# Patient Record
Sex: FEMALE | Race: WHITE | NOT HISPANIC OR LATINO
[De-identification: names, ages, dates, MRNs, and addresses within clinical notes are randomized per-mention and may not be internally consistent; named-entity substitution may affect disease eponyms.]

---

## 2023-05-05 ENCOUNTER — TRANSCRIPTION ENCOUNTER (OUTPATIENT)
Age: 73
End: 2023-05-05

## 2023-05-05 PROBLEM — Z00.00 ENCOUNTER FOR PREVENTIVE HEALTH EXAMINATION: Status: ACTIVE | Noted: 2023-05-05

## 2023-05-12 ENCOUNTER — NON-APPOINTMENT (OUTPATIENT)
Age: 73
End: 2023-05-12

## 2023-05-12 ENCOUNTER — APPOINTMENT (OUTPATIENT)
Dept: PAIN MANAGEMENT | Facility: CLINIC | Age: 73
End: 2023-05-12
Payer: MEDICARE

## 2023-05-12 VITALS
WEIGHT: 177 LBS | OXYGEN SATURATION: 96 % | SYSTOLIC BLOOD PRESSURE: 136 MMHG | HEART RATE: 71 BPM | DIASTOLIC BLOOD PRESSURE: 90 MMHG | BODY MASS INDEX: 29.49 KG/M2 | HEIGHT: 65 IN

## 2023-05-12 DIAGNOSIS — E07.9 DISORDER OF THYROID, UNSPECIFIED: ICD-10-CM

## 2023-05-12 DIAGNOSIS — I10 ESSENTIAL (PRIMARY) HYPERTENSION: ICD-10-CM

## 2023-05-12 DIAGNOSIS — Z87.39 PERSONAL HISTORY OF OTHER DISEASES OF THE MUSCULOSKELETAL SYSTEM AND CONNECTIVE TISSUE: ICD-10-CM

## 2023-05-12 PROCEDURE — 99204 OFFICE O/P NEW MOD 45 MIN: CPT

## 2023-05-12 RX ORDER — LEVOTHYROXINE SODIUM 0.17 MG/1
TABLET ORAL
Refills: 0 | Status: ACTIVE | COMMUNITY

## 2023-05-12 RX ORDER — AMLODIPINE BESYLATE 5 MG/1
TABLET ORAL
Refills: 0 | Status: ACTIVE | COMMUNITY

## 2023-05-12 NOTE — HISTORY OF PRESENT ILLNESS
[Back Pain] : back pain [Constant] : constant [7] : a current pain level of 7/10 [10] : a maximum pain level of 10/10 [Sharp] : sharp [Dull] : dull [Shooting] : shooting [Bending] : bending [Rest] : rest [FreeTextEntry1] : 72 yof presents w/ severe low back pain. Patient underwent L4-L5 laminectomy at the [facility] spine [facility] with Dr. Rowe on 06/18/2015. Back pain began in the early 2000's after bunion surgery. Back surgery did provide her relief. She then had right hip surgery in 2017 at [facility] which was beneficial. She had left knee meniscus surgery with Dr. Gutierrez in 2020. Quality of life is impaired. There has been a severe exacerbation of the patient's chronic pain.

## 2023-05-12 NOTE — DATA REVIEWED
[No studies available for review at this time.] : No studies available for review at this time. [FreeTextEntry1] : MRI Lumbar Spine:\par \par L4-L5 laminectomy

## 2023-05-12 NOTE — PHYSICAL EXAM

## 2023-06-02 ENCOUNTER — APPOINTMENT (OUTPATIENT)
Dept: PAIN MANAGEMENT | Facility: CLINIC | Age: 73
End: 2023-06-02
Payer: MEDICARE

## 2023-06-02 VITALS
BODY MASS INDEX: 29.49 KG/M2 | DIASTOLIC BLOOD PRESSURE: 88 MMHG | HEIGHT: 65 IN | HEART RATE: 78 BPM | SYSTOLIC BLOOD PRESSURE: 136 MMHG | WEIGHT: 177 LBS | OXYGEN SATURATION: 93 %

## 2023-06-02 PROCEDURE — 99214 OFFICE O/P EST MOD 30 MIN: CPT

## 2023-06-02 NOTE — ASSESSMENT
[FreeTextEntry1] : 72 yof w/ continued back pain after L4-L5 laminectomy.\par \par I have personally reviewed the patient's MRI in detail and discussed it with them which is significant for L4-L5 previous surgery.\par \par The patient has failed to have relief with medication management. The patient has failed to have relief with more then six weeks of physical therapy within the last three months. Given the patients failure to improve with all other conservative measures, recommend L5-S1 interlaminar epidural steroid injection under fluoroscopic guidance. The patient will follow-up with me in my office two weeks following intervention.\par \par I have discussed in detail with the patient that an interventional spine procedure is associated with potential risks. The procedure may include an injection of steroid and potentially other medications (local anesthetic and normal saline) into the epidural space or surrounding tissue of the spine. There are significant risks of this procedure which include and are not limited to infection, bleeding, worsening pain, dural puncture leading to post-dural puncture headache, nerve damage, spinal cord injury, paralysis, stroke, and death. There is a chance that the procedure does not improve their pain. There are risks associated with the steroid being absorbed into the body systemically. These include dysphoria, difficulty sleeping, mood swings, and personality changes. Pre-menopausal women may notice a regularity his in her menstrual cycle for 2-3 months following the injection. Steroids can specifically affect patients with hypertension, diabetes, and peptic ulcers. The procedure may cause a temporary increase in blood pressure and blood glucose, and may adversely affect a peptic ulcer. Other, more rare complications, including avascular necrosis of the joints, glaucoma, and osteoporosis. I have discussed the risks of the procedure at length with the patient, and the potential benefits of pain relief. I have offered alternatives to the procedure. All questions were answered. The patient expressed understanding and wishes to proceed with the procedure.\par \par Physical therapy prescribed - goal will be to increase ROM, strengthening, postural training, other modalities ad matthew which may include massage and stim. Goals of therapy discussed with the patient in detail and will be discussed with physical therapist. Patient will follow-up following course of physical therapy to monitor progress and adjust therapy as needed.\par \par Acetaminophen 1,000 mg q8h prn for moderate pain. Risks, benefits, and alternatives of acetaminophen discussed with patient.\par \par Ibuprofen 600 mg q8h prn add when pain is not adequately controlled with acetaminophen. Risks, benefits, and alternatives of ibuprofen discussed with patient.\par \par Diet and nutritional strategies discussed which may improve patients pain and will improve overall health.\par

## 2023-06-02 NOTE — DATA REVIEWED
[No studies available for review at this time.] : No studies available for review at this time. [FreeTextEntry1] : MRI Lumbar Spine:\par \par L4-L5 laminectomy with worsening moderate canal stenosis.\par L5-S1: moderate right and mild left foraminal stenosis.

## 2023-06-02 NOTE — PHYSICAL EXAM

## 2023-06-02 NOTE — HISTORY OF PRESENT ILLNESS
[Back Pain] : back pain [Constant] : constant [7] : a current pain level of 7/10 [10] : a maximum pain level of 10/10 [Sharp] : sharp [Dull] : dull [Shooting] : shooting [Bending] : bending [Rest] : rest [FreeTextEntry1] : Interval History:\par Patient returns for follow-up today. She wishes to review her MRI and xray. Pain is progressing. Low back pain radiates down the bilateral lower extremities, left worse then right. Quality of life is impaired. There has been a severe exacerbation of the patient's chronic pain.\par \par HPI: 72 yof presents w/ severe low back pain. Patient underwent L4-L5 laminectomy at the [facility] spine institute with Dr. Rowe on 06/18/2015. Back pain began in the early 2000's after bunion surgery. Back surgery did provide her relief. She then had right hip surgery in 2017 at [facility] which was beneficial. She had left knee meniscus surgery with Dr. Gutierrez in 2020. Quality of life is impaired. There has been a severe exacerbation of the patient's chronic pain.

## 2023-06-06 ENCOUNTER — TRANSCRIPTION ENCOUNTER (OUTPATIENT)
Age: 73
End: 2023-06-06

## 2023-06-06 ENCOUNTER — APPOINTMENT (OUTPATIENT)
Dept: PAIN MANAGEMENT | Facility: HOSPITAL | Age: 73
End: 2023-06-06

## 2023-06-23 ENCOUNTER — APPOINTMENT (OUTPATIENT)
Dept: PAIN MANAGEMENT | Facility: CLINIC | Age: 73
End: 2023-06-23
Payer: MEDICARE

## 2023-06-23 VITALS
WEIGHT: 177 LBS | SYSTOLIC BLOOD PRESSURE: 133 MMHG | BODY MASS INDEX: 29.49 KG/M2 | DIASTOLIC BLOOD PRESSURE: 77 MMHG | OXYGEN SATURATION: 95 % | HEART RATE: 67 BPM | HEIGHT: 65 IN

## 2023-06-23 DIAGNOSIS — M54.16 RADICULOPATHY, LUMBAR REGION: ICD-10-CM

## 2023-06-23 DIAGNOSIS — M79.10 MYALGIA, UNSPECIFIED SITE: ICD-10-CM

## 2023-06-23 DIAGNOSIS — M47.817 SPONDYLOSIS W/OUT MYELOPATHY OR RADICULOPATHY, LUMBOSACRAL REGION: ICD-10-CM

## 2023-06-23 PROCEDURE — 99213 OFFICE O/P EST LOW 20 MIN: CPT

## 2023-06-23 NOTE — HISTORY OF PRESENT ILLNESS
[Back Pain] : back pain [Constant] : constant [7] : a current pain level of 7/10 [10] : a maximum pain level of 10/10 [Sharp] : sharp [Dull] : dull [Shooting] : shooting [Bending] : bending [Rest] : rest [FreeTextEntry1] : Interval History:\par Patient returns for follow-up today. She underwent ESTEFANIA with significant relief. She feels much better. Doing well in PT.\par \par HPI: 72 yof presents w/ severe low back pain. Patient underwent L4-L5 laminectomy at the [facility] spine institute with Dr. Rowe on 06/18/2015. Back pain began in the early 2000's after bunion surgery. Back surgery did provide her relief. She then had right hip surgery in 2017 at [facility] which was beneficial. She had left knee meniscus surgery with Dr. Gutierrez in 2020. Quality of life is impaired. There has been a severe exacerbation of the patient's chronic pain.\par \par Interventions:\par L5-S1 interlaminar

## 2023-06-23 NOTE — PHYSICAL EXAM

## 2023-06-23 NOTE — ASSESSMENT
[FreeTextEntry1] : 72 yof w/ continued back pain after L4-L5 laminectomy.\par \par I have personally reviewed the patient's MRI in detail and discussed it with them which is significant for L4-L5 previous surgery.\par \par Physical therapy prescribed - goal will be to increase ROM, strengthening, postural training, other modalities ad matthew which may include massage and stim. Goals of therapy discussed with the patient in detail and will be discussed with physical therapist. Patient will follow-up following course of physical therapy to monitor progress and adjust therapy as needed.\par \par Acetaminophen 1,000 mg q8h prn for moderate pain. Risks, benefits, and alternatives of acetaminophen discussed with patient.\par \par Ibuprofen 600 mg q8h prn add when pain is not adequately controlled with acetaminophen. Risks, benefits, and alternatives of ibuprofen discussed with patient.\par \par Diet and nutritional strategies discussed which may improve patients pain and will improve overall health.\par

## 2023-09-15 ENCOUNTER — APPOINTMENT (OUTPATIENT)
Dept: ORTHOPEDIC SURGERY | Facility: CLINIC | Age: 73
End: 2023-09-15
Payer: MEDICARE

## 2023-09-15 ENCOUNTER — RESULT REVIEW (OUTPATIENT)
Age: 73
End: 2023-09-15

## 2023-09-15 VITALS — BODY MASS INDEX: 29.49 KG/M2 | WEIGHT: 177 LBS | HEIGHT: 65 IN

## 2023-09-15 PROCEDURE — 20610 DRAIN/INJ JOINT/BURSA W/O US: CPT | Mod: 50

## 2023-09-15 PROCEDURE — 99213 OFFICE O/P EST LOW 20 MIN: CPT | Mod: 25

## 2023-10-11 ENCOUNTER — APPOINTMENT (OUTPATIENT)
Dept: ORTHOPEDIC SURGERY | Facility: CLINIC | Age: 73
End: 2023-10-11
Payer: MEDICARE

## 2023-10-11 VITALS — BODY MASS INDEX: 29.32 KG/M2 | WEIGHT: 176 LBS | HEIGHT: 65 IN

## 2023-10-11 PROCEDURE — 20610 DRAIN/INJ JOINT/BURSA W/O US: CPT | Mod: RT

## 2023-10-11 PROCEDURE — 99213 OFFICE O/P EST LOW 20 MIN: CPT | Mod: 25

## 2023-10-18 ENCOUNTER — APPOINTMENT (OUTPATIENT)
Dept: ORTHOPEDIC SURGERY | Facility: CLINIC | Age: 73
End: 2023-10-18
Payer: MEDICARE

## 2023-10-18 VITALS — WEIGHT: 176 LBS | HEIGHT: 65 IN | BODY MASS INDEX: 29.32 KG/M2

## 2023-10-18 PROCEDURE — 20610 DRAIN/INJ JOINT/BURSA W/O US: CPT | Mod: RT

## 2023-10-27 ENCOUNTER — APPOINTMENT (OUTPATIENT)
Dept: ORTHOPEDIC SURGERY | Facility: CLINIC | Age: 73
End: 2023-10-27
Payer: MEDICARE

## 2023-10-27 VITALS — BODY MASS INDEX: 29.16 KG/M2 | WEIGHT: 175 LBS | HEIGHT: 65 IN

## 2023-10-27 PROCEDURE — 20610 DRAIN/INJ JOINT/BURSA W/O US: CPT | Mod: RT

## 2024-01-12 ENCOUNTER — APPOINTMENT (OUTPATIENT)
Dept: ORTHOPEDIC SURGERY | Facility: CLINIC | Age: 74
End: 2024-01-12
Payer: MEDICARE

## 2024-01-12 VITALS — HEIGHT: 65 IN | WEIGHT: 175 LBS | BODY MASS INDEX: 29.16 KG/M2

## 2024-01-12 PROCEDURE — 99214 OFFICE O/P EST MOD 30 MIN: CPT | Mod: 57

## 2024-01-12 NOTE — HISTORY OF PRESENT ILLNESS
[de-identified] : Patient comes in with more than 6 months of left knee pain atraumatic in onset.  Patient has tried greater than 6 months of nsaids, physical therapy and doctor directed exercises and injections.  Patient continues to have pain that is 8/10 in severity and interferes with activities of daily living such as putting on shoes and socks, walking two blocks, and getting up and down from a chair and toilet.  Knee osteoarthritis outcome score is 8.1 right knee the same many questions about knee replacements

## 2024-01-12 NOTE — DISCUSSION/SUMMARY
[de-identified] : This patient has failed non-operative treatments for left knee arthritis and is now indicated for a total knee replacement.  I had a long discussion with the patient regarding the plan, the expected outcome, the risks, benefits, and alternatives of surgery. The risks include, but are not limited to, infection (which may require future surgery and removal of implants), bleeding (which may require a transfusion), damage to nerves, arteries, veins, tendons, muscles and other adjacent structures leading to numbness, weakness, decreased function and/or possible surgical repair. Also discussed the possibilities of intraoperative and post operative fractures, implant loosening, stiffness, need for extensive therapy and revision for variety of reasons, blood clots and other possible medical complications etc. right also she will think about it and call  deni

## 2024-01-12 NOTE — PHYSICAL EXAM
[de-identified] : Knee ranges 5-115 degrees with pain and crepitus stable to varus, valgus, anterior and posterior stress neurovascularly intact distally no pain with hip range of motion negative straight leg raise no effusion, synovitis, or edema or erythema skin intact both

## 2024-04-04 ENCOUNTER — RESULT REVIEW (OUTPATIENT)
Age: 74
End: 2024-04-04

## 2024-04-12 ENCOUNTER — APPOINTMENT (OUTPATIENT)
Dept: ORTHOPEDIC SURGERY | Facility: CLINIC | Age: 74
End: 2024-04-12
Payer: MEDICARE

## 2024-04-12 VITALS — BODY MASS INDEX: 29.16 KG/M2 | WEIGHT: 175 LBS | HEIGHT: 65 IN

## 2024-04-12 DIAGNOSIS — M17.0 BILATERAL PRIMARY OSTEOARTHRITIS OF KNEE: ICD-10-CM

## 2024-04-12 PROCEDURE — 99214 OFFICE O/P EST MOD 30 MIN: CPT

## 2024-04-12 NOTE — HISTORY OF PRESENT ILLNESS
[de-identified] : Patient comes in with more than 6 months of left knee pain atraumatic in onset.  Patient has tried greater than 6 months of nsaids, physical therapy and doctor directed exercises and injections.  Patient continues to have pain that is 8/10 in severity and interferes with activities of daily living such as putting on shoes and socks, walking two blocks, and getting up and down from a chair and toilet.  Knee osteoarthritis outcome score is 8.1

## 2024-04-12 NOTE — DISCUSSION/SUMMARY
[de-identified] : This patient has failed non-operative treatments for left knee arthritis and is now indicated for a total knee replacement.  I had a long discussion with the patient regarding the plan, the expected outcome, the risks, benefits, and alternatives of surgery. The risks include, but are not limited to, infection (which may require future surgery and removal of implants), bleeding (which may require a transfusion), damage to nerves, arteries, veins, tendons, muscles and other adjacent structures leading to numbness, weakness, decreased function and/or possible surgical repair. Also discussed the possibilities of intraoperative and post operative fractures, implant loosening, stiffness, need for extensive therapy and revision for variety of reasons, blood clots and other possible medical complications etc.

## 2024-04-16 ENCOUNTER — TRANSCRIPTION ENCOUNTER (OUTPATIENT)
Age: 74
End: 2024-04-16

## 2024-04-16 ENCOUNTER — RESULT REVIEW (OUTPATIENT)
Age: 74
End: 2024-04-16

## 2024-04-16 ENCOUNTER — APPOINTMENT (OUTPATIENT)
Dept: ORTHOPEDIC SURGERY | Facility: HOSPITAL | Age: 74
End: 2024-04-16
Payer: MEDICARE

## 2024-04-16 PROCEDURE — 27447 TOTAL KNEE ARTHROPLASTY: CPT | Mod: LT

## 2024-05-15 ENCOUNTER — RESULT REVIEW (OUTPATIENT)
Age: 74
End: 2024-05-15

## 2024-05-15 ENCOUNTER — APPOINTMENT (OUTPATIENT)
Dept: ORTHOPEDIC SURGERY | Facility: CLINIC | Age: 74
End: 2024-05-15
Payer: MEDICARE

## 2024-05-15 VITALS — BODY MASS INDEX: 29.16 KG/M2 | HEIGHT: 65 IN | WEIGHT: 175 LBS

## 2024-05-15 DIAGNOSIS — Z96.652 PRESENCE OF LEFT ARTIFICIAL KNEE JOINT: ICD-10-CM

## 2024-05-15 PROCEDURE — 99024 POSTOP FOLLOW-UP VISIT: CPT

## 2024-05-15 NOTE — DISCUSSION/SUMMARY
[de-identified] : one month out from joint replacement doing well we discussed medications, activities, precautions and fu

## 2024-05-15 NOTE — PHYSICAL EXAM
[de-identified] : incision is well healed, clean dry and intact knee ranges from 0-115 degrees stable to aneror, posterior, varus and valgus stress no motor or sensory deficits and neurovasculary intact no clubbing, cyanosis or edema normal gait [de-identified] : tka looks great

## 2024-05-15 NOTE — HISTORY OF PRESENT ILLNESS
[de-identified] : patient is about one month out from their joint replacement.  overall doing well.  no major complaints.  pain well controlled.

## 2024-11-13 ENCOUNTER — APPOINTMENT (OUTPATIENT)
Dept: ORTHOPEDIC SURGERY | Facility: CLINIC | Age: 74
End: 2024-11-13
Payer: MEDICARE

## 2024-11-13 ENCOUNTER — NON-APPOINTMENT (OUTPATIENT)
Age: 74
End: 2024-11-13

## 2024-11-13 VITALS
BODY MASS INDEX: 29.16 KG/M2 | DIASTOLIC BLOOD PRESSURE: 88 MMHG | SYSTOLIC BLOOD PRESSURE: 147 MMHG | HEART RATE: 69 BPM | HEIGHT: 65 IN | OXYGEN SATURATION: 96 % | WEIGHT: 175 LBS

## 2024-11-13 DIAGNOSIS — Z96.652 PRESENCE OF LEFT ARTIFICIAL KNEE JOINT: ICD-10-CM

## 2024-11-13 PROCEDURE — 99214 OFFICE O/P EST MOD 30 MIN: CPT

## 2024-11-13 PROCEDURE — 73562 X-RAY EXAM OF KNEE 3: CPT | Mod: LT

## 2024-11-13 NOTE — DISCUSSION/SUMMARY
[de-identified] : home exercise program and reassurance reviewed home exercises with patient and plan

## 2024-11-13 NOTE — HISTORY OF PRESENT ILLNESS
[de-identified] : some clicking and occ pain in tka no trauma no f/c/ns right knee been painful and getting shots, may be relying on left more

## 2024-11-13 NOTE — PHYSICAL EXAM
[de-identified] : incision is well healed, clean dry and intact knee ranges from 0-125 degrees stable to aneror, posterior, varus and valgus stress no motor or sensory deficits and neurovasculary intact no clubbing, cyanosis or edema normal gait [de-identified] : ap/pa/lat show well positioned left knee replacement

## 2025-03-21 ENCOUNTER — APPOINTMENT (OUTPATIENT)
Dept: ORTHOPEDIC SURGERY | Facility: CLINIC | Age: 75
End: 2025-03-21
Payer: MEDICARE

## 2025-03-21 DIAGNOSIS — Z96.652 PRESENCE OF LEFT ARTIFICIAL KNEE JOINT: ICD-10-CM

## 2025-03-21 PROCEDURE — 99214 OFFICE O/P EST MOD 30 MIN: CPT

## 2025-03-21 PROCEDURE — 73564 X-RAY EXAM KNEE 4 OR MORE: CPT | Mod: LT

## 2025-03-21 NOTE — HISTORY OF PRESENT ILLNESS
[de-identified] : almost 1 year from tka no f/c/ns no trauma clicking gone away but lateral pain worsening nohelia w stairs

## 2025-03-21 NOTE — DISCUSSION/SUMMARY
[de-identified] : discussed options wants to proceed with arthroscopic removal discussed r/b/a  supervision

## 2025-03-21 NOTE — PHYSICAL EXAM
[de-identified] : incision is well healed, clean dry and intact knee ranges from 0-125 degrees stable to aneror, posterior, varus and valgus stress no motor or sensory deficits and neurovasculary intact no clubbing, cyanosis or edema normal gait no effusion point tenderness laterall over distal femur [de-identified] : 4 views left knee ap/pa/lat/sunrise well positioned tka small bone spur/cement near point of ttp

## 2025-05-01 ENCOUNTER — APPOINTMENT (OUTPATIENT)
Dept: ORTHOPEDIC SURGERY | Facility: HOSPITAL | Age: 75
End: 2025-05-01

## 2025-05-14 ENCOUNTER — APPOINTMENT (OUTPATIENT)
Dept: ORTHOPEDIC SURGERY | Facility: CLINIC | Age: 75
End: 2025-05-14